# Patient Record
Sex: MALE | Race: WHITE | Employment: OTHER | URBAN - METROPOLITAN AREA
[De-identification: names, ages, dates, MRNs, and addresses within clinical notes are randomized per-mention and may not be internally consistent; named-entity substitution may affect disease eponyms.]

---

## 2017-08-18 ENCOUNTER — HOSPITAL ENCOUNTER (OUTPATIENT)
Dept: OTHER | Age: 82
Discharge: HOME | End: 2017-08-18
Attending: SURGERY | Admitting: SURGERY

## 2017-08-18 ENCOUNTER — OFFICE VISIT (OUTPATIENT)
Dept: SURGERY | Age: 82
End: 2017-08-18

## 2017-08-18 ENCOUNTER — PROCEDURE VISIT (OUTPATIENT)
Dept: SURGERY | Age: 82
End: 2017-08-18

## 2017-08-18 VITALS
BODY MASS INDEX: 22.55 KG/M2 | DIASTOLIC BLOOD PRESSURE: 70 MMHG | HEIGHT: 68 IN | WEIGHT: 148.8 LBS | SYSTOLIC BLOOD PRESSURE: 137 MMHG | HEART RATE: 68 BPM

## 2017-08-18 DIAGNOSIS — I70.90 OCCLUSIVE DISEASE, ARTERIAL: ICD-10-CM

## 2017-08-18 DIAGNOSIS — I49.9 IRREGULAR HEART RHYTHM: ICD-10-CM

## 2017-08-18 DIAGNOSIS — R01.1 MURMUR, CARDIAC: ICD-10-CM

## 2017-08-18 DIAGNOSIS — I65.23 CAROTID STENOSIS, ASYMPTOMATIC, BILATERAL: Primary | ICD-10-CM

## 2017-08-18 DIAGNOSIS — I65.23 CAROTID STENOSIS, ASYMPTOMATIC, BILATERAL: ICD-10-CM

## 2017-08-18 LAB
EKG ATRIAL RATE: 72 BPM
EKG DIAGNOSIS: NORMAL
EKG P AXIS: -28 DEGREES
EKG P-R INTERVAL: 152 MS
EKG Q-T INTERVAL: 374 MS
EKG QRS DURATION: 92 MS
EKG QTC CALCULATION (BAZETT): 409 MS
EKG R AXIS: 46 DEGREES
EKG T AXIS: 20 DEGREES
EKG VENTRICULAR RATE: 72 BPM

## 2017-08-18 PROCEDURE — 1123F ACP DISCUSS/DSCN MKR DOCD: CPT | Performed by: SURGERY

## 2017-08-18 PROCEDURE — G8598 ASA/ANTIPLAT THER USED: HCPCS | Performed by: SURGERY

## 2017-08-18 PROCEDURE — G8427 DOCREV CUR MEDS BY ELIG CLIN: HCPCS | Performed by: SURGERY

## 2017-08-18 PROCEDURE — G8420 CALC BMI NORM PARAMETERS: HCPCS | Performed by: SURGERY

## 2017-08-18 PROCEDURE — 1036F TOBACCO NON-USER: CPT | Performed by: SURGERY

## 2017-08-18 PROCEDURE — 99214 OFFICE O/P EST MOD 30 MIN: CPT | Performed by: SURGERY

## 2017-08-18 PROCEDURE — 4040F PNEUMOC VAC/ADMIN/RCVD: CPT | Performed by: SURGERY

## 2017-08-18 PROCEDURE — 93010 ELECTROCARDIOGRAM REPORT: CPT | Performed by: INTERNAL MEDICINE

## 2017-08-18 PROCEDURE — 93880 EXTRACRANIAL BILAT STUDY: CPT | Performed by: SURGERY

## 2017-08-18 ASSESSMENT — ENCOUNTER SYMPTOMS
EYE DISCHARGE: 0
EYE REDNESS: 0
PHOTOPHOBIA: 0
GASTROINTESTINAL NEGATIVE: 1
ALLERGIC/IMMUNOLOGIC NEGATIVE: 1
RESPIRATORY NEGATIVE: 1
EYE PAIN: 0
EYE ITCHING: 0

## 2017-08-23 ENCOUNTER — TELEPHONE (OUTPATIENT)
Dept: SURGERY | Age: 82
End: 2017-08-23

## 2017-08-23 NOTE — TELEPHONE ENCOUNTER
Returned the call to the patient advising the patient, per [facility] the EKG does not reflect A-Fib , the patient has been advised the report of the EKG did reflect premature beats the patient has been advise to follow up with his PCP, his PCP has been forwarded a copy of the report as well. The patient agreed to this, and was ok with the information provided.

## 2018-08-17 ENCOUNTER — OFFICE VISIT (OUTPATIENT)
Dept: SURGERY | Age: 83
End: 2018-08-17

## 2018-08-17 ENCOUNTER — PROCEDURE VISIT (OUTPATIENT)
Dept: SURGERY | Age: 83
End: 2018-08-17

## 2018-08-17 VITALS
HEIGHT: 68 IN | WEIGHT: 146 LBS | SYSTOLIC BLOOD PRESSURE: 152 MMHG | DIASTOLIC BLOOD PRESSURE: 70 MMHG | BODY MASS INDEX: 22.13 KG/M2

## 2018-08-17 DIAGNOSIS — I49.9 IRREGULAR HEART RHYTHM: ICD-10-CM

## 2018-08-17 DIAGNOSIS — I65.23 CAROTID STENOSIS, ASYMPTOMATIC, BILATERAL: Primary | ICD-10-CM

## 2018-08-17 DIAGNOSIS — I65.23 CAROTID STENOSIS, ASYMPTOMATIC, BILATERAL: ICD-10-CM

## 2018-08-17 DIAGNOSIS — I70.90 OCCLUSIVE DISEASE, ARTERIAL: ICD-10-CM

## 2018-08-17 PROCEDURE — 93880 EXTRACRANIAL BILAT STUDY: CPT | Performed by: SURGERY

## 2018-08-17 PROCEDURE — 4040F PNEUMOC VAC/ADMIN/RCVD: CPT | Performed by: SURGERY

## 2018-08-17 PROCEDURE — 1036F TOBACCO NON-USER: CPT | Performed by: SURGERY

## 2018-08-17 PROCEDURE — 99214 OFFICE O/P EST MOD 30 MIN: CPT | Performed by: SURGERY

## 2018-08-17 PROCEDURE — G8420 CALC BMI NORM PARAMETERS: HCPCS | Performed by: SURGERY

## 2018-08-17 PROCEDURE — G8427 DOCREV CUR MEDS BY ELIG CLIN: HCPCS | Performed by: SURGERY

## 2018-08-17 PROCEDURE — 1101F PT FALLS ASSESS-DOCD LE1/YR: CPT | Performed by: SURGERY

## 2018-08-17 PROCEDURE — 1123F ACP DISCUSS/DSCN MKR DOCD: CPT | Performed by: SURGERY

## 2018-08-17 PROCEDURE — G8598 ASA/ANTIPLAT THER USED: HCPCS | Performed by: SURGERY

## 2018-08-17 ASSESSMENT — ENCOUNTER SYMPTOMS
EYE REDNESS: 0
PHOTOPHOBIA: 0
EYE ITCHING: 0
EYE PAIN: 0
EYE DISCHARGE: 0
GASTROINTESTINAL NEGATIVE: 1
RESPIRATORY NEGATIVE: 1
ALLERGIC/IMMUNOLOGIC NEGATIVE: 1

## 2018-08-17 NOTE — PROGRESS NOTES
Daily Progress Note   Ben Delaney MD      8/17/2018    Chief Complaint   Patient presents with    1 Year Follow Up     1 year f/u for Carotid disease with CDS, GINA's and office visit. HISTORY OF PRESENT ILLNESS:                The patient is a 80 y.o. male who presents with Carotid Artery Stenosis. Patient states he does not experience any pain to the LE when walking a great distance. Patient states she continues to reside in his [facility] from the months of October - May. And in  2years won't come back    Past Medical History:   Diagnosis Date    Abdominal aneurysm without mention of rupture     Arthritis     Carotid stenosis, asymptomatic     Hyperlipidemia     Hypertension        Past Surgical History:   Procedure Laterality Date    BACK SURGERY      EYE SURGERY      HAND SURGERY      MOUTH SURGERY      PROSTATE SURGERY         Social History     Social History    Marital status:      Spouse name: N/A    Number of children: N/A    Years of education: N/A     Occupational History    Not on file. Social History Main Topics    Smoking status: Former Smoker    Smokeless tobacco: Never Used    Alcohol use Yes      Comment: occasionally    Drug use: No    Sexual activity: Not on file     Other Topics Concern    Not on file     Social History Narrative    No narrative on file       History reviewed. No pertinent family history. Current Outpatient Prescriptions:     methylPREDNISolone (MEDROL, ALIS,) 4 MG tablet, Use as directed, Disp: 21 kit, Rfl: 0    meloxicam (MOBIC) 15 MG tablet, Take 1 tablet by mouth daily, Disp: 30 tablet, Rfl: 3    meloxicam (MOBIC) 15 MG tablet, Take 1 tablet by mouth daily, Disp: 30 tablet, Rfl: 3    terbinafine (LAMISIL) 250 MG tablet,   , Disp: , Rfl: 0    amLODIPine (NORVASC) 2.5 MG tablet, Take 2.5 mg by mouth daily. , Disp: , Rfl:     vitamin D (CHOLECALCIFEROL) 1000 UNIT TABS tablet, Take 1,000 Units by mouth daily. , Disp: , Rfl:     Red Yeast Rice 600 MG CAPS, Take  by mouth., Disp: , Rfl:     polycarbophil (FIBERCON) 625 MG tablet, Take 625 mg by mouth daily. , Disp: , Rfl:     acetaminophen (TYLENOL) 650 MG suppository, Place 650 mg rectally every 4 hours as needed. , Disp: , Rfl:     omeprazole (PRILOSEC) 20 MG capsule, Take 20 mg by mouth daily. , Disp: , Rfl:     lisinopril (PRINIVIL;ZESTRIL) 20 MG tablet, Take 40 mg by mouth daily. , Disp: , Rfl:     aspirin 81 MG EC tablet, Take 81 mg by mouth daily. , Disp: , Rfl:     Ascorbic Acid (VITAMIN C) 500 MG tablet, Take 500 mg by mouth daily. , Disp: , Rfl:     fish oil-omega-3 fatty acids 1000 MG capsule, Take 2 g by mouth daily. , Disp: , Rfl:     therapeutic multivitamin-minerals (THERAGRAN-M) tablet, Take 1 tablet by mouth daily. , Disp: , Rfl:     lovastatin (MEVACOR) 20 MG tablet, Take 20 mg by mouth nightly.  , Disp: , Rfl:     Patient has no known allergies. Vitals:    08/17/18 1048 08/17/18 1103   BP: (!) 140/70 (!) 152/70   Weight: 146 lb (66.2 kg)    Height: 5' 8\" (1.727 m)        Hospital Outpatient Visit on 08/18/2017   Component Date Value Ref Range Status    Ventricular Rate 08/18/2017 72  BPM Final    Atrial Rate 08/18/2017 72  BPM Final    P-R Interval 08/18/2017 152  ms Final    QRS Duration 08/18/2017 92  ms Final    Q-T Interval 08/18/2017 374  ms Final    QTc Calculation (Bazett) 08/18/2017 409  ms Final    P Axis 08/18/2017 -28  degrees Final    R Axis 08/18/2017 46  degrees Final    T Axis 08/18/2017 20  degrees Final    Diagnosis 08/18/2017    Final                    Value:Sinus rhythm with frequent and consecutive Premature ventricular complexes  Abnormal ECG  No previous ECGs available  Confirmed by ELSA ZAMARRIPA [facility] MD, Genesis Swenson ([phone]) on 8/18/2017 2:39:55 PM         No results found. Review of Systems   Constitutional: Negative. HENT: Negative. Eyes: Positive for visual disturbance ( Wears eye glasses).  Negative for photophobia, normal.   Skin: Skin is warm, dry and intact. No bruising and no rash noted. No erythema. Psychiatric: He has a normal mood and affect. His speech is normal and behavior is normal. Judgment and thought content normal. Cognition and memory are normal.   Nursing note and vitals reviewed. ASSESSMENT:    Problem List Items Addressed This Visit     Occlusive disease, arterial    Irregular heart rhythm      Other Visit Diagnoses     Carotid stenosis, asymptomatic, bilateral    -  Primary          PLAN:  Discussed the results of the following studies performed in the office today. GINA's done in office today: Rt PT 0, DP 0; Lt PT 0.59, DP 0.59  CDS results reviewed with pt: ISIDRO 6-22%, LICA 45% - This is no significant change from study done 8/18/17. Signs and symptoms of TIA and stroke reviewed with patient. Discussed irregular heart rate, ECG reviewed from 8/18/17. EKG was read as normal sinus rhythm with multiple PVCs  Also discussed AAA scan from 6/17/13 that measured 2.91 cm. Follow up in 1 year by a [address] [place] [address] for CDS, and AAA. No orders of the defined types were placed in this encounter. No Follow-up on file. Felicitas Wiley MA am scribing for and in the presence of Marina Espinoza MD on this date of 08/17/18 at 1:33 PM      I Moo Tabor MD personally performed the services described in this documentation as scribed by the Medical Assistant Angella Beckett  in my presence and it is both accurate and complete.       HPI

## 2019-06-04 ENCOUNTER — OFFICE VISIT (OUTPATIENT)
Dept: SURGERY | Age: 84
End: 2019-06-04
Payer: MEDICARE

## 2019-06-04 ENCOUNTER — PROCEDURE VISIT (OUTPATIENT)
Dept: SURGERY | Age: 84
End: 2019-06-04
Payer: MEDICARE

## 2019-06-04 VITALS — DIASTOLIC BLOOD PRESSURE: 78 MMHG | WEIGHT: 149 LBS | SYSTOLIC BLOOD PRESSURE: 140 MMHG | BODY MASS INDEX: 22.66 KG/M2

## 2019-06-04 DIAGNOSIS — I65.23 CAROTID STENOSIS, ASYMPTOMATIC, BILATERAL: Primary | ICD-10-CM

## 2019-06-04 DIAGNOSIS — I70.90 OCCLUSIVE DISEASE, ARTERIAL: ICD-10-CM

## 2019-06-04 DIAGNOSIS — I71.40 ABDOMINAL AORTIC ANEURYSM (AAA) WITHOUT RUPTURE: ICD-10-CM

## 2019-06-04 DIAGNOSIS — I65.22 ASYMPTOMATIC STENOSIS OF LEFT CAROTID ARTERY: ICD-10-CM

## 2019-06-04 PROCEDURE — 1036F TOBACCO NON-USER: CPT | Performed by: SURGERY

## 2019-06-04 PROCEDURE — G8420 CALC BMI NORM PARAMETERS: HCPCS | Performed by: SURGERY

## 2019-06-04 PROCEDURE — 93880 EXTRACRANIAL BILAT STUDY: CPT | Performed by: SURGERY

## 2019-06-04 PROCEDURE — 99214 OFFICE O/P EST MOD 30 MIN: CPT | Performed by: SURGERY

## 2019-06-04 PROCEDURE — G8598 ASA/ANTIPLAT THER USED: HCPCS | Performed by: SURGERY

## 2019-06-04 PROCEDURE — 1123F ACP DISCUSS/DSCN MKR DOCD: CPT | Performed by: SURGERY

## 2019-06-04 PROCEDURE — 4040F PNEUMOC VAC/ADMIN/RCVD: CPT | Performed by: SURGERY

## 2019-06-04 PROCEDURE — G8427 DOCREV CUR MEDS BY ELIG CLIN: HCPCS | Performed by: SURGERY

## 2019-06-04 ASSESSMENT — ENCOUNTER SYMPTOMS
ALLERGIC/IMMUNOLOGIC NEGATIVE: 1
GASTROINTESTINAL NEGATIVE: 1
EYE DISCHARGE: 0
EYE PAIN: 0
PHOTOPHOBIA: 0
EYE REDNESS: 0
EYE ITCHING: 0
RESPIRATORY NEGATIVE: 1

## 2019-06-04 NOTE — PROGRESS NOTES
of clubs or organizations: Not on file     Relationship status: Not on file    Intimate partner violence:     Fear of current or ex partner: Not on file     Emotionally abused: Not on file     Physically abused: Not on file     Forced sexual activity: Not on file   Other Topics Concern    Not on file   Social History Narrative    Not on file       No family history on file. Current Outpatient Medications:     meloxicam (MOBIC) 15 MG tablet, Take 1 tablet by mouth daily, Disp: 30 tablet, Rfl: 3    meloxicam (MOBIC) 15 MG tablet, Take 1 tablet by mouth daily, Disp: 30 tablet, Rfl: 3    terbinafine (LAMISIL) 250 MG tablet,   , Disp: , Rfl: 0    amLODIPine (NORVASC) 2.5 MG tablet, Take 2.5 mg by mouth daily. , Disp: , Rfl:     vitamin D (CHOLECALCIFEROL) 1000 UNIT TABS tablet, Take 1,000 Units by mouth daily. , Disp: , Rfl:     Red Yeast Rice 600 MG CAPS, Take  by mouth., Disp: , Rfl:     polycarbophil (FIBERCON) 625 MG tablet, Take 625 mg by mouth daily. , Disp: , Rfl:     acetaminophen (TYLENOL) 650 MG suppository, Place 650 mg rectally every 4 hours as needed. , Disp: , Rfl:     omeprazole (PRILOSEC) 20 MG capsule, Take 20 mg by mouth daily. , Disp: , Rfl:     lisinopril (PRINIVIL;ZESTRIL) 20 MG tablet, Take 40 mg by mouth daily. , Disp: , Rfl:     Ascorbic Acid (VITAMIN C) 500 MG tablet, Take 500 mg by mouth daily. , Disp: , Rfl:     fish oil-omega-3 fatty acids 1000 MG capsule, Take 2 g by mouth daily. , Disp: , Rfl:     therapeutic multivitamin-minerals (THERAGRAN-M) tablet, Take 1 tablet by mouth daily. , Disp: , Rfl:     lovastatin (MEVACOR) 20 MG tablet, Take 20 mg by mouth nightly.  , Disp: , Rfl:     methylPREDNISolone (MEDROL, ALIS,) 4 MG tablet, Use as directed, Disp: 21 kit, Rfl: 0    aspirin 81 MG EC tablet, Take 81 mg by mouth daily. , Disp: , Rfl:     Patient has no known allergies.     Vitals:    06/04/19 1009 06/04/19 1010   BP: (!) 148/80 (!) 140/78   Site: Right Upper Arm present. No thyromegaly present. Cardiovascular: Normal rate, regular rhythm, normal heart sounds and intact distal pulses. Pulses:       Carotid pulses are 2+ on the right side, and 2+ on the left side. Femoral pulses are 2+ on the right side, and 2+ on the left side with bruit. Popliteal pulses are 1+ on the right side, and 0 on the left side. Dorsalis pedis pulses are 0 on the right side, and 0 on the left side. Posterior tibial pulses are 0 on the right side, and 0 on the left side. Doppler 6/4/2019:  Rt DP: Monophasic (weak) located laterally  Rt PT: Monophasic (weak)  Rt AT: Monophasic     Lt DP: Biphasic   Lt PT: Monophasic        Doppler 8/17/2018:  Rt DP: Monophasic near feet (weak)  Rt PT: Monophasic  Rt AT: Monophasic (weak)  Lt DP: Monophasic   Lt PT: Monophasic    Doppler 8/18/2017:  Rt DP: Monophasic (weak)  Rt PT: Monophasic  Rt AT: Monophasic ( heard from around the rear)    Lt DP: Biphasic   Lt PT: Monophasic      Pt states he can walk 1-2 miles without a problem. Doppler 8/16/16:  Right PT - Monophasic   Right DP - No Signal  Right AT - Monophasic     Left PT - Biphasic  Left DP - No Signal  Left AT - Monophasic   Pulmonary/Chest: Effort normal and breath sounds normal. No accessory muscle usage. No tachypnea. No respiratory distress. He has no wheezes. He has no rales. He exhibits no tenderness. Abdominal: Soft. Bowel sounds are normal. He exhibits no distension, no abdominal bruit and no mass. There is no hepatosplenomegaly. There is no tenderness. There is no rigidity, no rebound, no guarding and no CVA tenderness. A hernia is present. Hernia confirmed positive in the left inguinal area. Hernia confirmed negative in the ventral area and confirmed negative in the right inguinal area. Genitourinary:   Genitourinary Comments: RECTAL EXAM  &  Guaiac NOT INDICATED   Musculoskeletal: Normal range of motion. He exhibits no edema or tenderness.         Back: Legs:  Lymphadenopathy:        Head (right side): No submental, no submandibular, no preauricular and no occipital adenopathy present. Head (left side): No submental, no submandibular, no preauricular and no occipital adenopathy present. He has no cervical adenopathy. Right cervical: No superficial cervical, no deep cervical and no posterior cervical adenopathy present. Left cervical: No superficial cervical, no deep cervical and no posterior cervical adenopathy present. Right axillary: No pectoral and no lateral adenopathy present. Left axillary: No pectoral and no lateral adenopathy present. Right: No inguinal and no supraclavicular adenopathy present. Left: No inguinal and no supraclavicular adenopathy present. Neurological: He is alert and oriented to person, place, and time. He displays no atrophy and no tremor. No cranial nerve deficit or sensory deficit. He exhibits normal muscle tone. Coordination and gait normal.   Skin: Skin is warm, dry and intact. No bruising and no rash noted. No erythema. Psychiatric: He has a normal mood and affect. His speech is normal and behavior is normal. Judgment and thought content normal. Cognition and memory are normal.   Nursing note and vitals reviewed. ASSESSMENT:    Problem List Items Addressed This Visit     Occlusive disease, arterial      Other Visit Diagnoses     Carotid stenosis, asymptomatic, bilateral    -  Primary          PLAN:  Discussed the results of the following studies performed in the office today. CDS results reviewed with pt: ISIDRO 4-81%, LICA 98-97% - This is no significant change from study done 8/17/2018. Signs and symptoms of TIA and stroke reviewed with patient. Plans to continue to follow up with his PCP in [place], who orders his AAA 2.9 cm last time it was checked should be followed at least every other year with ultrasound and CDS follow ups/studies.  Every 6 months  Patient has significant occlusive arterial disease right worse than left but it remains asymptomatic even know he walks over a mile suspect distal disease. Although he does have a bruit in his right groin        Return if symptoms worsen or fail to improve/ Plans to continue care in [place]. I Zaira Edwards MA am scribing for and in the presence of Elizabeth Webber MD on this date of 06/04/19    I Tamie Flores MD personally performed the services described in this documentation as scribed by the Medical Assistant Zaira Edwards  in my presence and it is both accurate and complete.         Electronically signed by Elizabeth Webber MD on 6/4/2019 at 10:52 AM

## 2019-06-04 NOTE — LETTER
[address] [facility] Vascular Surgery  [facility] [address]  Phone: [phone]  Fax: [phone]    Coty Torres MD        June 4, 2019     Dalila Ritchie, [address] [facility] [address] [place] [address]    Patient: Jose Johnson  MR Number: [id number]  YOB: 1933  Date of Visit: 6/4/2019    Dear Dr. Dalila Ritchie:    I saw Jane Moore today for the evaluation of carotid aorta and peripheral arterial disease Below are the relevant portions of my assessment and plan of care. ASSESSMENT:    Problem List Items Addressed This Visit     Occlusive disease, arterial      Other Visit Diagnoses     Carotid stenosis, asymptomatic, bilateral    -  Primary   AAA       PLAN:  Discussed the results of the following studies performed in the office today. CDS results reviewed with pt: ISIDRO 7-49%, LICA 21-74% - This is no significant change from study done 8/17/2018. Signs and symptoms of TIA and stroke reviewed with patient. Plans to continue to follow up with his PCP in [place], who orders his AAA 2.9 cm last time it was checked should be followed at least every other year with ultrasound and CDS follow ups/studies. Every 6 months  Patient has significant occlusive arterial disease right worse than left but it remains asymptomatic even know he walks over a mile suspect distal disease. Although he does have a bruit in his right groin    He is moving to [place] permanently this August so we will not see him back        If you have questions, please do not hesitate to call me. I look forward to following Miarcle Getting along with you.     Sincerely,        Coty Torres MD

## 2019-06-04 NOTE — PATIENT INSTRUCTIONS
Discussed the results of the following studies performed in the office today. CDS results reviewed with pt: ISIDRO 4-94%, LICA 58-68% - This is no significant change from study done 8/17/2018. Signs and symptoms of TIA and stroke reviewed with patient. Plans to continue to follow up with his PCP in [place], who orders his AAA and CDS follow ups/studies.